# Patient Record
Sex: FEMALE | Race: WHITE | NOT HISPANIC OR LATINO | Employment: OTHER | URBAN - NONMETROPOLITAN AREA
[De-identification: names, ages, dates, MRNs, and addresses within clinical notes are randomized per-mention and may not be internally consistent; named-entity substitution may affect disease eponyms.]

---

## 2018-07-05 ENCOUNTER — OFFICE VISIT (OUTPATIENT)
Dept: OTOLARYNGOLOGY | Facility: CLINIC | Age: 73
End: 2018-07-05

## 2018-07-05 VITALS — WEIGHT: 117.8 LBS | BODY MASS INDEX: 22.24 KG/M2 | HEIGHT: 61 IN | TEMPERATURE: 97.5 F

## 2018-07-05 DIAGNOSIS — H61.111: Primary | ICD-10-CM

## 2018-07-05 PROCEDURE — 99213 OFFICE O/P EST LOW 20 MIN: CPT | Performed by: OTOLARYNGOLOGY

## 2018-07-05 RX ORDER — LISINOPRIL 5 MG/1
5 TABLET ORAL
COMMUNITY
Start: 2018-05-09

## 2018-07-05 NOTE — PROGRESS NOTES
Subjective   Enedelia Francis is a 73 y.o. female.   Right ear abnormality  History of Present Illness   She noted that there is a difference between her right external ear and are left.  She's had no pain no drainage and tenderness no erythema no hearing loss.  She notes no prior history of trauma      The following portions of the patient's history were reviewed and updated as appropriate: allergies, current medications, past family history, past medical history, past social history, past surgical history and problem list.      Enedelia Francis reports that she has never smoked. She has never used smokeless tobacco. She reports that she drinks alcohol.  Patient is not a tobacco user and has not been counseled for use of tobacco products    Family History   Problem Relation Age of Onset   • Cancer Other    • Heart disease Other    • Thyroid disease Other          Current Outpatient Prescriptions:   •  lisinopril (PRINIVIL,ZESTRIL) 5 MG tablet, Take 5 mg by mouth., Disp: , Rfl:     No Known Allergies    Past Medical History:   Diagnosis Date   • Hypertension          Review of Systems   Constitutional: Negative for fever.   HENT: Negative for ear discharge and ear pain.    Hematological: Negative for adenopathy.   All other systems reviewed and are negative.          Objective   Physical Exam   Constitutional: She appears well-nourished.   HENT:   Head: Normocephalic.   Right Ear: Hearing, tympanic membrane and ear canal normal. Right ear middle ear effusion: mass appears comfortable with the underlying cartilage.   Left Ear: Hearing, tympanic membrane, external ear and ear canal normal.   Ears:    Nose: Nose normal.   Mouth/Throat: Uvula is midline, oropharynx is clear and moist and mucous membranes are normal.   Eyes: Conjunctivae are normal.   Neck: Normal range of motion.   Pulmonary/Chest: Effort normal.   Neurological: She is alert.   Skin: Skin is warm.   Psychiatric: She has a normal mood and affect. Her  behavior is normal. Thought content normal.   Nursing note and vitals reviewed.       The area is elevated approximately 5 mm extends a little over 12 mm on the ridge on the right inferior antihelix  No erythema or tenderness or fluctuance    Assessment/Plan   Enedelia was seen today for ear problem.    Diagnoses and all orders for this visit:    Deformity of cartilage of ear, right        As the patient is entirely asymptomatic she was to avoid biopsy or any other treatment.  It was tender over suggested she avoid pressure and lying on that ear and consider topical steroids.  She states symptomatically thinks it's been there a long time and she is unsure of his change much of recorded size were rechecked in 6 months she's to [place]esar changes or problems or tenderness the meantime but she does not want consider biopsy or any intervention unless her's change in symptoms.

## 2018-07-05 NOTE — PATIENT INSTRUCTIONS

## 2021-01-19 ENCOUNTER — IMMUNIZATION (OUTPATIENT)
Dept: VACCINE CLINIC | Facility: HOSPITAL | Age: 76
End: 2021-01-19

## 2021-01-19 PROCEDURE — 0001A: CPT | Performed by: THORACIC SURGERY (CARDIOTHORACIC VASCULAR SURGERY)

## 2021-01-19 PROCEDURE — 91300 HC SARSCOV02 VAC 30MCG/0.3ML IM: CPT | Performed by: THORACIC SURGERY (CARDIOTHORACIC VASCULAR SURGERY)

## 2021-02-09 ENCOUNTER — IMMUNIZATION (OUTPATIENT)
Dept: VACCINE CLINIC | Facility: HOSPITAL | Age: 76
End: 2021-02-09

## 2021-02-09 PROCEDURE — 0002A: CPT | Performed by: THORACIC SURGERY (CARDIOTHORACIC VASCULAR SURGERY)

## 2021-02-09 PROCEDURE — 91300 HC SARSCOV02 VAC 30MCG/0.3ML IM: CPT | Performed by: THORACIC SURGERY (CARDIOTHORACIC VASCULAR SURGERY)
